# Patient Record
Sex: MALE | Race: WHITE | NOT HISPANIC OR LATINO | Employment: FULL TIME | URBAN - METROPOLITAN AREA
[De-identification: names, ages, dates, MRNs, and addresses within clinical notes are randomized per-mention and may not be internally consistent; named-entity substitution may affect disease eponyms.]

---

## 2017-03-20 ENCOUNTER — HOSPITAL ENCOUNTER (OUTPATIENT)
Dept: SLEEP CENTER | Facility: CLINIC | Age: 57
Discharge: HOME | End: 2017-03-20
Payer: COMMERCIAL

## 2017-03-20 ENCOUNTER — TRANSCRIBE ORDERS (OUTPATIENT)
Dept: SLEEP CENTER | Facility: CLINIC | Age: 57
End: 2017-03-20

## 2017-03-20 DIAGNOSIS — G47.33 OSA (OBSTRUCTIVE SLEEP APNEA): ICD-10-CM

## 2017-03-20 DIAGNOSIS — G47.33 OSA (OBSTRUCTIVE SLEEP APNEA): Primary | ICD-10-CM

## 2018-03-26 ENCOUNTER — OFFICE VISIT (OUTPATIENT)
Dept: SLEEP CENTER | Facility: CLINIC | Age: 58
End: 2018-03-26
Payer: COMMERCIAL

## 2018-03-26 VITALS
DIASTOLIC BLOOD PRESSURE: 76 MMHG | HEART RATE: 80 BPM | HEIGHT: 67 IN | WEIGHT: 263.2 LBS | BODY MASS INDEX: 41.31 KG/M2 | SYSTOLIC BLOOD PRESSURE: 120 MMHG

## 2018-03-26 DIAGNOSIS — G47.33 OSA (OBSTRUCTIVE SLEEP APNEA): Primary | ICD-10-CM

## 2018-03-26 DIAGNOSIS — IMO0001 CLASS 3 OBESITY DUE TO EXCESS CALORIES WITH SERIOUS COMORBIDITY AND BODY MASS INDEX (BMI) OF 40.0 TO 44.9 IN ADULT: ICD-10-CM

## 2018-03-26 PROCEDURE — 99214 OFFICE O/P EST MOD 30 MIN: CPT | Performed by: PSYCHIATRY & NEUROLOGY

## 2018-03-26 RX ORDER — LISINOPRIL 10 MG/1
10 TABLET ORAL
COMMUNITY
Start: 2017-07-06

## 2018-03-26 RX ORDER — BIMATOPROST 0.3 MG/ML
SOLUTION/ DROPS OPHTHALMIC
COMMUNITY
Start: 2012-10-31

## 2018-03-26 RX ORDER — TESTOSTERONE 12.5 MG/1.25G
GEL TOPICAL
COMMUNITY
Start: 2012-02-29

## 2018-03-26 NOTE — PROGRESS NOTES
Progress Note - [address] [facility] 59 y o  male   ROSALIO:[birth year], MRN: [id number]  3/26/2018          Follow Up Evaluation / Problem:     Obstructive Sleep Apnea  Obesity    HPI: Quintin Houser is a 59y o  year old male  He has a history of obstructive sleep apnea and obesity  He returns today for re-evaluation  Review of Systems      Genitourinary none   Cardiology none   Gastrointestinal none   Neurology none   Constitutional none   Integumentary none   Psychiatry none   Musculoskeletal none   Pulmonary none   ENT nasal or sinus congestion and post nasal drip   Endocrine none   Hematological none       Current Outpatient Prescriptions:     bimatoprost (LUMIGAN) 0 03 % ophthalmic drops, 1 drop each eye every night, Disp: , Rfl:     lisinopril (ZESTRIL) 10 mg tablet, Take 10 mg by mouth, Disp: , Rfl:     testosterone (ANDROGEL) 25 MG/2 5GM (1%) GEL, Place on the skin, Disp: , Rfl:     [place] Sleepiness Scale  Sitting and reading: Would never doze  Watching TV: Would never doze  Sitting, inactive in a public place (e g  a theatre or a meeting): Would never doze  As a passenger in a car for an hour without a break: Slight chance of dozing  Lying down to rest in the afternoon when circumstances permit: Slight chance of dozing  Sitting and talking to someone: Would never doze  Sitting quietly after a lunch without alcohol: Would never doze  In a car, while stopped for a few minutes in traffic: Would never doze  Total score: 2         Vitals:    03/26/18 0800   BP: 120/76   Pulse: 80   Weight: 119 kg (263 lb 3 2 oz)   Height: 5' 7" (1 702 m)       Body mass index is 41 22 kg/m²  Neck Circumference: 44       EPWORTH SLEEPINESS SCORE  Total score: 2      Past History Since Last Sleep Center Visit:     He continues to use nasal CPAP at 13 cm of water pressure  To this point in time there has been no significant difficulty    He does avoid the use of CPAP when having significant nasal congestion weight is been stable over the last several years  He has controlled his diet as best as possible but has difficulty losing weight due to his abnormal schedule  The following portions of the patient's history were reviewed and updated as appropriate: allergies, current medications, past family history, past medical history, past social history, past surgical history and problem list     OBJECTIVE    PAP Pressure: Nasal CPAP set to deliver 13 cm of water pressure  DME Provider:  Javed    Physical Exam:     General Appearance:   Alert, cooperative, no distress, appears stated age     Head:   Normocephalic, without obvious abnormality, atraumatic     Eyes:   PERRL, conjunctiva/corneas clear, EOM's intact          Nose:  Nares normal, septum midline, no drainage or sinus tenderness           Throat:  Lips, teeth and gums normal; tongue normal size and  shape and midline mucosa redundant bilaterally, uvula long and thick, tonsils not well seen, Mallampati class 4      Neck:  Supple, symmetrical, trachea midline, no adenopathy; Thyroid: No enlargement, tenderness or nodules; no carotid bruit or JVD     Lungs:      Clear to auscultation bilaterally, respirations unlabored     Heart:   Regular rate and rhythm, S1 and S2 normal, no murmur, rub or gallop       Extremities:  Extremities normal, atraumatic, no cyanosis or edema     Pulses:  2+ and symmetric all extremities     Skin:  Skin color, texture, turgor normal, no rashes or lesions       Neurologic:  CNII-XII intact  Normal strength, sensation throughout       ASSESSMENT / PLAN    1  SUREKHA (obstructive sleep apnea)     2  Class 3 obesity due to excess calories with serious comorbidity and body mass index (BMI) of 40 0 to 44 9 in adult [facility])             Counseling / Coordination of Care  Total clinic time spent today 25 minutes  Greater than 50% of total time was spent with the patient and / or family counseling and / or coordination of care  A description of the counseling / coordination of care:     Impressions, Prognosis, Instructions for management, Risks and benefits of treatment, Patient and family education and Importance of compliance with treatment    The following instructions have been given to the patient today:    Patient Instructions   1  Continue nasal CPAP at 13 cm of water  2  Obtain new CPAP supplies on a regular basis  3  Provide a new prescription to obtain CPAP supplies on a regular basis over the next 12 months  4  Attempt weight loss using a combination of diet and exercise  5  Return in 1 year for routine follow-up evaluation  6  Contact the Sleep [address] [place] [address] with any problems that may arise prior to time          Leodan Trotter

## 2018-03-26 NOTE — PATIENT INSTRUCTIONS
1   Continue nasal CPAP at 13 cm of water  2  Obtain new CPAP supplies on a regular basis  3  Provide a new prescription to obtain CPAP supplies on a regular basis over the next 12 months  4  Attempt weight loss using a combination of diet and exercise  5  Return in 1 year for routine follow-up evaluation  6  Contact the Sleep [address] [place] [address] with any problems that may arise prior to time

## 2019-04-01 ENCOUNTER — OFFICE VISIT (OUTPATIENT)
Dept: SLEEP CENTER | Facility: CLINIC | Age: 59
End: 2019-04-01
Payer: COMMERCIAL

## 2019-04-01 VITALS
HEIGHT: 67 IN | WEIGHT: 261.4 LBS | SYSTOLIC BLOOD PRESSURE: 144 MMHG | HEART RATE: 80 BPM | BODY MASS INDEX: 41.03 KG/M2 | DIASTOLIC BLOOD PRESSURE: 72 MMHG

## 2019-04-01 DIAGNOSIS — G47.33 OSA (OBSTRUCTIVE SLEEP APNEA): Primary | ICD-10-CM

## 2019-04-01 PROCEDURE — 99215 OFFICE O/P EST HI 40 MIN: CPT | Performed by: PSYCHIATRY & NEUROLOGY

## 2020-04-02 ENCOUNTER — TELEPHONE (OUTPATIENT)
Dept: SLEEP CENTER | Facility: CLINIC | Age: 60
End: 2020-04-02

## 2020-05-07 ENCOUNTER — TELEMEDICINE (OUTPATIENT)
Dept: SLEEP CENTER | Facility: CLINIC | Age: 60
End: 2020-05-07
Payer: COMMERCIAL

## 2020-05-07 VITALS
HEART RATE: 80 BPM | DIASTOLIC BLOOD PRESSURE: 72 MMHG | BODY MASS INDEX: 40.97 KG/M2 | HEIGHT: 67 IN | SYSTOLIC BLOOD PRESSURE: 144 MMHG | WEIGHT: 261 LBS

## 2020-05-07 DIAGNOSIS — E66.01 CLASS 3 SEVERE OBESITY DUE TO EXCESS CALORIES WITHOUT SERIOUS COMORBIDITY WITH BODY MASS INDEX (BMI) OF 40.0 TO 44.9 IN ADULT (HCC): ICD-10-CM

## 2020-05-07 DIAGNOSIS — G47.33 OSA (OBSTRUCTIVE SLEEP APNEA): Primary | ICD-10-CM

## 2020-05-07 PROCEDURE — 99213 OFFICE O/P EST LOW 20 MIN: CPT | Performed by: PSYCHIATRY & NEUROLOGY

## 2020-05-08 ENCOUNTER — TELEPHONE (OUTPATIENT)
Dept: SLEEP CENTER | Facility: CLINIC | Age: 60
End: 2020-05-08

## 2020-12-30 ENCOUNTER — IMMUNIZATIONS (OUTPATIENT)
Dept: FAMILY MEDICINE CLINIC | Facility: HOSPITAL | Age: 60
End: 2020-12-30
Payer: COMMERCIAL

## 2020-12-30 DIAGNOSIS — Z23 ENCOUNTER FOR IMMUNIZATION: ICD-10-CM

## 2020-12-30 PROCEDURE — 0011A SARS-COV-2 / COVID-19 MRNA VACCINE (MODERNA) 100 MCG: CPT

## 2020-12-30 PROCEDURE — 91301 SARS-COV-2 / COVID-19 MRNA VACCINE (MODERNA) 100 MCG: CPT

## 2021-01-26 ENCOUNTER — IMMUNIZATIONS (OUTPATIENT)
Dept: FAMILY MEDICINE CLINIC | Facility: HOSPITAL | Age: 61
End: 2021-01-26

## 2021-01-26 DIAGNOSIS — Z23 ENCOUNTER FOR IMMUNIZATION: Primary | ICD-10-CM

## 2021-01-26 PROCEDURE — 0012A SARS-COV-2 / COVID-19 MRNA VACCINE (MODERNA) 100 MCG: CPT

## 2021-01-26 PROCEDURE — 91301 SARS-COV-2 / COVID-19 MRNA VACCINE (MODERNA) 100 MCG: CPT

## 2021-05-03 ENCOUNTER — TELEPHONE (OUTPATIENT)
Dept: SLEEP CENTER | Facility: CLINIC | Age: 61
End: 2021-05-03

## 2021-05-10 ENCOUNTER — OFFICE VISIT (OUTPATIENT)
Dept: SLEEP CENTER | Facility: CLINIC | Age: 61
End: 2021-05-10
Payer: COMMERCIAL

## 2021-05-10 VITALS
HEIGHT: 67 IN | DIASTOLIC BLOOD PRESSURE: 62 MMHG | WEIGHT: 258.6 LBS | SYSTOLIC BLOOD PRESSURE: 130 MMHG | BODY MASS INDEX: 40.59 KG/M2

## 2021-05-10 DIAGNOSIS — G47.33 OBSTRUCTIVE SLEEP APNEA: Primary | ICD-10-CM

## 2021-05-10 DIAGNOSIS — I10 BENIGN ESSENTIAL HYPERTENSION: ICD-10-CM

## 2021-05-10 PROCEDURE — 99214 OFFICE O/P EST MOD 30 MIN: CPT | Performed by: NURSE PRACTITIONER

## 2021-05-10 NOTE — PROGRESS NOTES
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  Obesity      Thank you for the opportunity of participating in the evaluation and care of this patient in the Sleep Clinic at [facility]  HPI: Libia Mcconnell is a 56y o  year old male  The patient presents for follow up of severe obstructive sleep apnea  He completed a diagnostic sleep study in May 2010, which confirmed severe SUREKHA with AHI of 39 7 and oxygen lainey of 86%  He began the use of CPAP and continues to use his original equipment  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, 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  He has taken his equipment, but also sleeps without it at times, due to inconvenience  He sleeps much more soundly and feels less sleepy when using the equipment  Review of Systems      Genitourinary none   Cardiology none   Gastrointestinal none   Neurology none   Constitutional none   Integumentary none   Psychiatry none   Musculoskeletal back pain   Pulmonary snoring   ENT none   Endocrine none   Hematological none       Current Outpatient Medications:     bimatoprost (LUMIGAN) 0 03 % ophthalmic drops, 1 drop each eye every night, Disp: , Rfl:     lisinopril (ZESTRIL) 10 mg tablet, Take 10 mg by mouth, Disp: , Rfl:     testosterone (ANDROGEL) 25 MG/2 5GM (1%) GEL, Place on the skin, Disp: , Rfl:     [place] Sleepiness Scale  Sitting and reading: Would never doze  Watching TV: Slight chance of dozing  Sitting, inactive in a public place (e g  a theatre or a meeting):  Would never doze  As a passenger in a car for an hour without a break: Slight chance of dozing  Lying down to rest in the afternoon when circumstances permit: Slight chance of dozing  Sitting and talking to someone: Would never doze  Sitting quietly after a lunch without alcohol: Slight chance of dozing  In a car, while stopped for a few minutes in traffic: Would never doze  Total score: 4              Vitals:    05/10/21 0800   BP: 130/62   Weight: 117 kg (258 lb 9 6 oz)   Height: 5' 7" (1 702 m)       Body mass index is 40 5 kg/m²  Neck Circumference: 18       EPWORTH SLEEPINESS SCORE  Total score: 4      Past History Since Last Sleep Center Visit:   He denies any significant changes to his health since his last visit  He tries to use his CPAP equipment as much as possible, but is required to work longer shifts at times  He feels sleep is much improved when using PAP therapy  He likes the current mask and feels the current pressure setting is comfortable  The patient reports that he cleans the equipment appropriately and changes supplies on a regular basis  The review of systems and following portions of the patient's history were reviewed and updated as appropriate: allergies, current medications, past family history, past medical history, past social history, past surgical history, and problem list       OBJECTIVE    PAP Pressure: Nasal CPAP set to deliver 13 cm of water pressure  Pressure changed to 11-13cm  Type of mask used: nasal  DME Provider: [facility] ([reviously used [facility])    Physical Exam:     General Appearance:   Alert, cooperative, no distress, appears stated age, obese     Head:   Normocephalic, without obvious abnormality, atraumatic     Eyes:   PERRL, conjunctiva/corneas clear, EOM's intact          Nose:  Nares normal, septum midline, no drainage or sinus tenderness           Throat:  Lips, teeth and gums normal; tongue normal size and  shape and midline mucosa moist and mildly redundant bilaterally, uvula normal, tonsils not visualized, Mallampati class 3-4       Neck:  Supple, symmetrical, trachea midline, no adenopathy;  Thyroid: No enlargement, tenderness or nodules; no carotid bruit or JVD     Lungs:      Clear to auscultation bilaterally, respirations unlabored     Heart:   Regular rate and rhythm, S1 and S2 normal, no murmur, rub or gallop       Extremities:  Extremities normal, atraumatic, no cyanosis or edema       Skin:  Skin color, texture, turgor normal, no rashes or lesions       Neurologic:  No focal deficits noted       ASSESSMENT / PLAN    1  Obstructive sleep apnea  Cpap New DME   2  Benign essential hypertension           Counseling / Coordination of Care  Total clinic time spent today 30 minutes  Greater than 50% of total time was spent with the patient and / or family counseling and / or coordination of care  A description of the counseling / coordination of care:     Impressions, Diagnostic results, Prognosis, Instructions for management, Risks and benefits of treatment, Patient and family education, Risk factor reductions and Importance of compliance with treatment    Today I reviewed the patient's compliance data  His equipment no longer transmits data, however, he brought his SD card  There was minimal data noted, which indicated an AHI of 0 0 at a pressure of 13cm  The patient feels they benefit from the use of PAP equipment and would like to continue PAP therapy  Response to treatment has been very good  He would like to have his equipment replaced  An order for new CPAP equipment, using a setting of APAP 11-13cm has been provided  He used [facility] in the past, which is no longer compatible with his insurance  He would like to change DME providers to [facility]  We discussed the importance of meeting the compliance guidelines after receiving the new equipment  He states understanding of this  He will continue using this equipment at the settings noted above for the next 2-3 months  At that timehe will then return for a compliance follow-up evaluation   I have asked the patient to contact the Sleep [facility] if he encounters any difficulties prior to that time  The following instructions have been given to the patient today:    Patient Instructions   1  Schedule an appointment for set up of new CPAP equipment   2  Begin using your CPAP equipment every night  3  Begin cleaning your CPAP daily after use and once weekly with 1 part white vinegar and 10 parts water  4  Contact your medical equipment distributor regarding any questions concerning your CPAP equipment  5  Contact the Sleep [address] [place] [address] with any other questions, prior to next visit, if needed  6  Schedule compliance follow-up visit in 2-3 months      Nursing Support:  When: Monday through Friday 7A-5PM except holidays  Where: Our direct line is [phone]  If you are having a true emergency please call 911  In the event that the line is busy or it is after hours please leave a voice message and we will return your call  Please speak clearly, leaving your full name, birth date, best number to reach you and the reason for your call  Medication refills: We will need the name of the medication, the dosage, the ordering provider, whether you get a 30 or 90 day refill, and the pharmacy name and address  Medications will be ordered by the provider only  Nurses cannot call in prescriptions  Please allow 7 days for medication refills  Physician requested updates: If your provider requested that you call with an update after starting medication, please be ready to provide us the medication and dosage, what time you take your medication, the time you attempt to fall asleep, time you fall asleep, when you wake up, and what time you get out of bed  Sleep Study Results: We will contact you with sleep study results and/or next steps after the physician has reviewed your testing        Jourdan Clement [address] [place] [address]

## 2021-05-10 NOTE — PATIENT INSTRUCTIONS
1   Schedule an appointment for set up of new CPAP equipment   2  Begin using your CPAP equipment every night  3  Begin cleaning your CPAP daily after use and once weekly with 1 part white vinegar and 10 parts water  4  Contact your medical equipment distributor regarding any questions concerning your CPAP equipment  5  Contact the [address] with any other questions, prior to next visit, if needed  6  Schedule compliance follow-up visit in 2-3 months      Nursing Support:  When: Monday through Friday 7A-5PM except holidays  Where: Our direct line is [phone]  If you are having a true emergency please call 911  In the event that the line is busy or it is after hours please leave a voice message and we will return your call  Please speak clearly, leaving your full name, birth date, best number to reach you and the reason for your call  Medication refills: We will need the name of the medication, the dosage, the ordering provider, whether you get a 30 or 90 day refill, and the pharmacy name and address  Medications will be ordered by the provider only  Nurses cannot call in prescriptions  Please allow 7 days for medication refills  Physician requested updates: If your provider requested that you call with an update after starting medication, please be ready to provide us the medication and dosage, what time you take your medication, the time you attempt to fall asleep, time you fall asleep, when you wake up, and what time you get out of bed  Sleep Study Results: We will contact you with sleep study results and/or next steps after the physician has reviewed your testing

## 2021-05-13 ENCOUNTER — TELEPHONE (OUTPATIENT)
Dept: SLEEP CENTER | Facility: CLINIC | Age: 61
End: 2021-05-13

## 2021-05-13 NOTE — TELEPHONE ENCOUNTER
Left message for patient, following up from visit 5/10/2021 with  Kenton Flores     Will need to verify if he received script or would like to make appointment with [facility]

## 2021-05-14 NOTE — TELEPHONE ENCOUNTER
I spoke with patient, states he would like to use [facility]  Scheduled set up 5/28/2021 in [facility] representative aware    Compliance follow up rescheduled for 7/23/2021

## 2021-05-14 NOTE — TELEPHONE ENCOUNTER
Patient left voice message stating he needs to make an appointment to get his CPAP machine  Left message for patient to call office to schedule set up  Phone number provided  Patient has compliance follow up with Romel Muñoz on 8/25/21 that will likely need to be rescheduled

## 2021-05-31 ENCOUNTER — TELEPHONE (OUTPATIENT)
Dept: SLEEP CENTER | Facility: CLINIC | Age: 61
End: 2021-05-31

## 2021-07-23 ENCOUNTER — OFFICE VISIT (OUTPATIENT)
Dept: SLEEP CENTER | Facility: CLINIC | Age: 61
End: 2021-07-23
Payer: COMMERCIAL

## 2021-07-23 VITALS
HEART RATE: 86 BPM | HEIGHT: 67 IN | OXYGEN SATURATION: 99 % | BODY MASS INDEX: 40.49 KG/M2 | DIASTOLIC BLOOD PRESSURE: 80 MMHG | WEIGHT: 258 LBS | SYSTOLIC BLOOD PRESSURE: 140 MMHG

## 2021-07-23 DIAGNOSIS — G47.33 OBSTRUCTIVE SLEEP APNEA TREATED WITH CONTINUOUS POSITIVE AIRWAY PRESSURE (CPAP): Primary | ICD-10-CM

## 2021-07-23 DIAGNOSIS — Z99.89 OBSTRUCTIVE SLEEP APNEA TREATED WITH CONTINUOUS POSITIVE AIRWAY PRESSURE (CPAP): Primary | ICD-10-CM

## 2021-07-23 PROCEDURE — 99214 OFFICE O/P EST MOD 30 MIN: CPT | Performed by: NURSE PRACTITIONER

## 2021-07-23 NOTE — PATIENT INSTRUCTIONS
1   Continue use of CPAP equipment nightly  2  Continue to clean your equipment, as discussed  3  Contact the Sleep [address] [place] [address] with any questions or concerns prior to your next visit, as needed  4  Schedule visit for follow-up in one year    Consider downloading the Dream  melissa to view your usage, mask fit and AHI, with a goal of AHI less than 5      Nursing Support:  When: Monday through Friday 7A-5PM except holidays  Where: Our direct line is [phone]  If you are having a true emergency please call 911  In the event that the line is busy or it is after hours please leave a voice message and we will return your call  Please speak clearly, leaving your full name, birth date, best number to reach you and the reason for your call  Medication refills: We will need the name of the medication, the dosage, the ordering provider, whether you get a 30 or 90 day refill, and the pharmacy name and address  Medications will be ordered by the provider only  Nurses cannot call in prescriptions  Please allow 7 days for medication refills  Physician requested updates: If your provider requested that you call with an update after starting medication, please be ready to provide us the medication and dosage, what time you take your medication, the time you attempt to fall asleep, time you fall asleep, when you wake up, and what time you get out of bed  Sleep Study Results: We will contact you with sleep study results and/or next steps after the physician has reviewed your testing

## 2021-07-23 NOTE — PROGRESS NOTES
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  Obesity      Thank you for the opportunity of participating in the evaluation and care of this patient in the Sleep Clinic at [facility]  HPI: Michelle Heaton is a 56y o  year old male  The patient presents for follow up of severe obstructive sleep apnea  He completed a diagnostic sleep study in May 2010, which confirmed severe SUREKHA with AHI of 39 7 and oxygen lainey of 86%  He began the use of CPAP and continues to use his original equipment  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, 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  He has taken his equipment, but also sleeps without it at times, due to inconvenience  He sleeps much more soundly and feels less sleepy when using the equipment  Review of Systems      Genitourinary none   Cardiology none   Gastrointestinal none   Neurology none   Constitutional none   Integumentary none   Psychiatry none   Musculoskeletal none   Pulmonary snoring   ENT none   Endocrine none   Hematological none       Current Outpatient Medications:     lisinopril (ZESTRIL) 10 mg tablet, Take 10 mg by mouth, Disp: , Rfl:     testosterone (ANDROGEL) 25 MG/2 5GM (1%) GEL, Place on the skin, Disp: , Rfl:     bimatoprost (LUMIGAN) 0 03 % ophthalmic drops, 1 drop each eye every night (Patient not taking: Reported on 7/23/2021), Disp: , Rfl:     Montrose Sleepiness Scale  Sitting and reading: Would never doze  Watching TV: Would never doze  Sitting, inactive in a public place (e g  a theatre or a meeting):  Would never doze  As a passenger in a car for an hour without a break: Would never doze  Lying down to rest in the afternoon when circumstances permit: Would never doze  Sitting and talking to someone: Would never doze  Sitting quietly after a lunch without alcohol: Would never doze  In a car, while stopped for a few minutes in traffic: Would never doze  Total score: 0              Vitals:    07/23/21 1000   BP: 140/80   Pulse: 86   SpO2: 99%   Weight: 117 kg (258 lb)   Height: 5' 7" (1 702 m)       Body mass index is 40 41 kg/m²  Neck Circumference: 17       EPWORTH SLEEPINESS SCORE  Total score: 0      Past History Since Last Sleep Center Visit:   He denies any significant changes to his health since his last visit  He received his new CPAP equipment and began using it on June 1st   He tries to use it every day, but on some days, he is not sleeping the minimum requirement of 4 hours within the period, due to being away at work and on call  He likes the new machine and feels the setting is very comfortable  He is using a new nasal mask and likes it much more than his older model  The patient reports that he cleans the equipment appropriately with ild soap and water and does not use any ozone   The review of systems and following portions of the patient's history were reviewed and updated as appropriate: allergies, current medications, past family history, past medical history, past social history, past surgical history, and problem list       OBJECTIVE    PAP Pressure:  APAP 11-13cm  Type of mask used: nasal  DME Provider: [employer]     Physical Exam:     General Appearance:   Alert, cooperative, no distress, appears stated age, obese     Head:   Normocephalic, without obvious abnormality, atraumatic     Eyes:   PERRL, conjunctiva/corneas clear, EOM's intact          Nose:  Nares normal, septum midline, no drainage or sinus tenderness           Throat:  Lips, teeth and gums normal; tongue normal size and  shape and midline      Neck:  Supple, symmetrical, trachea midline, no adenopathy;  Thyroid: No enlargement, tenderness or nodules; no carotid bruit or JVD     Lungs:      Clear to auscultation bilaterally, respirations unlabored     Heart:   Regular rate and rhythm, S1 and S2 normal, no murmur, rub or gallop       Extremities:  Extremities normal, atraumatic, no cyanosis or edema       Skin:  Skin color, texture, turgor normal, no rashes or lesions       Neurologic:  No focal deficits noted       ASSESSMENT / PLAN    1  Obstructive sleep apnea treated with continuous positive airway pressure (CPAP)  PAP DME Resupply/Reorder         Counseling / Coordination of Care  Total clinic time spent today 30 minutes  Greater than 50% of total time was spent with the patient and / or family counseling and / or coordination of care  A description of the counseling / coordination of care:     Impressions, Diagnostic results, Prognosis, Instructions for management, Risks and benefits of treatment, Patient and family education, Risk factor reductions and Importance of compliance with treatment    Today I reviewed the patient's compliance data  He has been able to use the equipment 90% of all days recorded  Average usage was 4 or more hours 66 7% of all days recorded  The estimated AHI is 0 3 abnormal breathing events per hour  The patient feels he benefits from the use of the equipment and would like to continue PAP treatment  Response to treatment has been good  He will try to increase hourly compliance as he is able with his shift work schedule  Ii suggested he consider taking it to work with him to use if he is able to sleep at all between emergency calls  Supplies have been ordered for the next year  We discussed the use of the Dream  melissa, which will allow the patient to view usage, mask fit and AHI, with a goal of less than 5  He will continue using this equipment at the settings noted above for the next 12  months  At that time he will return for a routine follow-up evaluation   I have asked the patient to contact the [facility] if any difficulties are encountered prior to that time  The following instructions have been given to the patient today:    Patient Instructions   1  Continue use of CPAP equipment nightly  2  Continue to clean your equipment, as discussed  3  Contact the Sleep [facility] with any questions or concerns prior to your next visit, as needed  4  Schedule visit for follow-up in one year    Consider downloading the Dream  melissa to view your usage, mask fit and AHI, with a goal of AHI less than 5      Nursing Support:  When: Monday through Friday 7A-5PM except holidays  Where: Our direct line is [phone]  If you are having a true emergency please call 911  In the event that the line is busy or it is after hours please leave a voice message and we will return your call  Please speak clearly, leaving your full name, birth date, best number to reach you and the reason for your call  Medication refills: We will need the name of the medication, the dosage, the ordering provider, whether you get a 30 or 90 day refill, and the pharmacy name and address  Medications will be ordered by the provider only  Nurses cannot call in prescriptions  Please allow 7 days for medication refills  Physician requested updates: If your provider requested that you call with an update after starting medication, please be ready to provide us the medication and dosage, what time you take your medication, the time you attempt to fall asleep, time you fall asleep, when you wake up, and what time you get out of bed  Sleep Study Results: We will contact you with sleep study results and/or next steps after the physician has reviewed your testing        Tamar Mcneill [facility]

## 2021-07-26 ENCOUNTER — TELEPHONE (OUTPATIENT)
Dept: SLEEP CENTER | Facility: CLINIC | Age: 61
End: 2021-07-26

## 2022-07-25 ENCOUNTER — OFFICE VISIT (OUTPATIENT)
Dept: SLEEP CENTER | Facility: CLINIC | Age: 62
End: 2022-07-25
Payer: COMMERCIAL

## 2022-07-25 VITALS
HEART RATE: 69 BPM | DIASTOLIC BLOOD PRESSURE: 60 MMHG | WEIGHT: 215 LBS | BODY MASS INDEX: 33.74 KG/M2 | HEIGHT: 67 IN | SYSTOLIC BLOOD PRESSURE: 102 MMHG

## 2022-07-25 DIAGNOSIS — G47.33 OBSTRUCTIVE SLEEP APNEA (ADULT) (PEDIATRIC): ICD-10-CM

## 2022-07-25 DIAGNOSIS — G47.33 OBSTRUCTIVE SLEEP APNEA TREATED WITH CONTINUOUS POSITIVE AIRWAY PRESSURE (CPAP): Primary | ICD-10-CM

## 2022-07-25 DIAGNOSIS — Z99.89 OBSTRUCTIVE SLEEP APNEA TREATED WITH CONTINUOUS POSITIVE AIRWAY PRESSURE (CPAP): Primary | ICD-10-CM

## 2022-07-25 DIAGNOSIS — Z99.89 DEPENDENCE ON OTHER ENABLING MACHINES AND DEVICES: ICD-10-CM

## 2022-07-25 PROBLEM — E83.119 HEMOCHROMATOSIS: Status: ACTIVE | Noted: 2022-07-20

## 2022-07-25 PROCEDURE — 99214 OFFICE O/P EST MOD 30 MIN: CPT | Performed by: NURSE PRACTITIONER

## 2022-07-25 RX ORDER — SPIRONOLACTONE 100 MG/1
50 TABLET, FILM COATED ORAL DAILY
COMMUNITY
Start: 2022-07-01

## 2022-07-25 RX ORDER — FUROSEMIDE 20 MG/1
TABLET ORAL
COMMUNITY
Start: 2022-07-06

## 2022-07-25 RX ORDER — LOSARTAN POTASSIUM 25 MG/1
TABLET ORAL
COMMUNITY
Start: 2022-06-20

## 2022-07-25 RX ORDER — CARVEDILOL 3.12 MG/1
3.12 TABLET ORAL 2 TIMES DAILY WITH MEALS
COMMUNITY
Start: 2022-07-07

## 2022-07-25 NOTE — PATIENT INSTRUCTIONS
1   Continue use of CPAP equipment nightly  2  Continue to clean your equipment, as discussed  3  Contact the Sleep [address] [place] [address] with any questions or concerns prior to your next visit, as needed  4  Schedule visit for follow-up in 1 year     Nursing Support:  When: Monday through Friday 7A-5PM except holidays  Where: Our direct line is [phone]  If you are having a true emergency please call 911  In the event that the line is busy or it is after hours please leave a voice message and we will return your call  Please speak clearly, leaving your full name, birth date, best number to reach you and the reason for your call  Medication refills: We will need the name of the medication, the dosage, the ordering provider, whether you get a 30 or 90 day refill, and the pharmacy name and address  Medications will be ordered by the provider only  Nurses cannot call in prescriptions  Please allow 7 days for medication refills  Physician requested updates: If your provider requested that you call with an update after starting medication, please be ready to provide us the medication and dosage, what time you take your medication, the time you attempt to fall asleep, time you fall asleep, when you wake up, and what time you get out of bed  Sleep Study Results: We will contact you with sleep study results and/or next steps after the physician has reviewed your testing

## 2022-07-25 NOTE — PROGRESS NOTES
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  Obesity      Thank you for the opportunity of participating in the evaluation and care of this patient in the Sleep Clinic at [facility]  HPI: Zari Tello is a 59y o  year old male  The patient presents for follow up of severe obstructive sleep apnea  He completed a diagnostic sleep study in May 2010, which confirmed severe SUREKHA with AHI of 39 7 and oxygen lainey of 86%  He began the use of CPAP and continues to use his original equipment  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, 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  He has taken his equipment, but also sleeps without it at times, due to inconvenience  He sleeps much more soundly and feels less sleepy when using the equipment  Review of Systems      Genitourinary none   Cardiology none   Gastrointestinal none   Neurology none   Constitutional none   Integumentary none   Psychiatry none   Musculoskeletal none   Pulmonary none   ENT none   Endocrine none   Hematological none       Current Outpatient Medications:     carvedilol (COREG) 3 125 mg tablet, Take 3 125 mg by mouth 2 (two) times a day with meals, Disp: , Rfl:     furosemide (LASIX) 20 mg tablet, TAKE 2 TABLETS (40 MG TOTAL) BY MOUTH DAILY  OR AS DIRECTED BY PHYSICIAN , Disp: , Rfl:     losartan (COZAAR) 25 mg tablet, TAKE 1 TABLET (25 MG TOTAL) BY MOUTH DAILY  , Disp: , Rfl:     spironolactone (ALDACTONE) 100 mg tablet, Take 100 mg by mouth daily, Disp: , Rfl:     bimatoprost (LUMIGAN) 0 03 % ophthalmic drops, 1 drop each eye every night (Patient not taking: No sig reported), Disp: , Rfl:     lisinopril (ZESTRIL) 10 mg tablet, Take 10 mg by mouth (Patient not taking: Reported on 7/25/2022), Disp: , Rfl:     testosterone (ANDROGEL) 25 MG/2 5GM (1%) GEL, Place on the skin (Patient not taking: Reported on 7/25/2022), Disp: , Rfl:     Homer Sleepiness Scale  Sitting and reading: Slight chance of dozing  Watching TV: Moderate chance of dozing  Sitting, inactive in a public place (e g  a theatre or a meeting): Would never doze  As a passenger in a car for an hour without a break: Moderate chance of dozing  Lying down to rest in the afternoon when circumstances permit: Moderate chance of dozing  Sitting and talking to someone: Would never doze  Sitting quietly after a lunch without alcohol: Would never doze  In a car, while stopped for a few minutes in traffic: Would never doze  Total score: 7              Vitals:    07/25/22 0938   BP: 102/60   BP Location: Left arm   Patient Position: Sitting   Cuff Size: Large   Pulse: 69   Weight: 97 5 kg (215 lb)   Height: 5' 7" (1 702 m)       Body mass index is 33 67 kg/m²  EPWORTH SLEEPINESS SCORE  Total score: 7      Past History Since Last Sleep Center Visit:   He developed hemochromatosis with symptoms beginning in June  He is currently not working as he is being evaluated and having treatment, including paracentesis  He continues to use the CPAP equipment  Since losing some weight, he feels like the pressure setting is too high  He awakens with belching and bloating  He feels more rested when he uses the CPAP equipment  The review of systems and following portions of the patient's history were reviewed and updated as appropriate: allergies, current medications, past family history, past medical history, past social history, past surgical history, and problem list       OBJECTIVE  Equipment set up date:  5/31/21 - DreamStation 2  PAP Pressure:  APAP 11-13cm - pressure change ordered - 8-12cm  Type of mask used: nasal  DME Provider:  [employer] - [facility]    Physical Exam:     General Appearance:   Alert, cooperative, no distress, appears stated age, obese     Head:   Normocephalic, without obvious abnormality, atraumatic     Eyes:   PERRL, conjunctiva/corneas clear, non-icteric          Nose:  Nares normal, septum midline, no drainage or sinus tenderness           Throat:  Lips, teeth and gums normal; tongue normal size and midline, mucosa moist and redundant bilaterally, uvula only partially visualized, tonsils not visualized, Mallampati class 3      Neck:  Supple, symmetrical, trachea midline, no adenopathy; Thyroid: No enlargement, tenderness or nodules; no carotid bruit or JVD     Lungs:      Clear to auscultation bilaterally, respirations unlabored     Heart:   Regular rate and rhythm, S1 and S2 normal, no murmur, rub or gallop       Extremities:  Extremities normal, atraumatic, no cyanosis and trace edema in LE bilaterally       Skin:  Appears slightly jaundiced, texture and turgor normal, no rashes or lesions       Neurologic:  No focal deficits noted       ASSESSMENT / PLAN    1  Obstructive sleep apnea treated with continuous positive airway pressure (CPAP)  PAP DME Pressure Change    PAP DME Resupply/Reorder         Counseling / Coordination of Care  Total clinic time spent today 30 minutes  Greater than 50% of total time was spent with the patient and / or family counseling and / or coordination of care  A description of the counseling / coordination of care:     Impressions, Diagnostic results, Prognosis, Instructions for management, Risks and benefits of treatment, Patient and family education, Risk factor reductions and Importance of compliance with treatment    Today I reviewed the patient's compliance data  He has been able to use the equipment 100% of all days recorded  Average usage was 4 or more hours 73 3% of all days recorded  The estimated AHI is 0 7 abnormal breathing events per hour    a pressure change to 8-12cm has been ordered and done in the office by the [address] [place] [address] representative  The patient feels he benefits from the use of the equipment and would like to continue PAP treatment  Response to treatment has been good  A prescription for supplies has been provided for the next year  He will continue using this equipment at the settings noted above for the next 12  months  At that time he will return for a routine follow-up evaluation  I have asked the patient to contact the [facility] if any difficulties are encountered prior to that time  The following instructions have been given to the patient today:    Patient Instructions   1  Continue use of CPAP equipment nightly  2  Continue to clean your equipment, as discussed  3  Contact the Sleep [facility] with any questions or concerns prior to your next visit, as needed  4  Schedule visit for follow-up in 1 year     Nursing Support:  When: Monday through Friday 7A-5PM except holidays  Where: Our direct line is [phone]  If you are having a true emergency please call 911  In the event that the line is busy or it is after hours please leave a voice message and we will return your call  Please speak clearly, leaving your full name, birth date, best number to reach you and the reason for your call  Medication refills: We will need the name of the medication, the dosage, the ordering provider, whether you get a 30 or 90 day refill, and the pharmacy name and address  Medications will be ordered by the provider only  Nurses cannot call in prescriptions  Please allow 7 days for medication refills  Physician requested updates: If your provider requested that you call with an update after starting medication, please be ready to provide us the medication and dosage, what time you take your medication, the time you attempt to fall asleep, time you fall asleep, when you wake up, and what time you get out of bed  Sleep Study Results:  We will contact you with sleep study results and/or next steps after the physician has reviewed your testing        Whitney Sharma, [facility]

## 2022-07-26 ENCOUNTER — TELEPHONE (OUTPATIENT)
Dept: SLEEP CENTER | Facility: CLINIC | Age: 62
End: 2022-07-26

## 2023-07-24 ENCOUNTER — TELEPHONE (OUTPATIENT)
Dept: SLEEP CENTER | Facility: CLINIC | Age: 63
End: 2023-07-24

## 2023-07-26 ENCOUNTER — OFFICE VISIT (OUTPATIENT)
Dept: SLEEP CENTER | Facility: CLINIC | Age: 63
End: 2023-07-26
Payer: COMMERCIAL

## 2023-07-26 VITALS
SYSTOLIC BLOOD PRESSURE: 129 MMHG | DIASTOLIC BLOOD PRESSURE: 81 MMHG | BODY MASS INDEX: 31.55 KG/M2 | HEART RATE: 81 BPM | HEIGHT: 67 IN | WEIGHT: 201 LBS

## 2023-07-26 DIAGNOSIS — G47.33 OBSTRUCTIVE SLEEP APNEA TREATED WITH CONTINUOUS POSITIVE AIRWAY PRESSURE (CPAP): Primary | ICD-10-CM

## 2023-07-26 DIAGNOSIS — Z99.89 OBSTRUCTIVE SLEEP APNEA TREATED WITH CONTINUOUS POSITIVE AIRWAY PRESSURE (CPAP): Primary | ICD-10-CM

## 2023-07-26 PROCEDURE — 99214 OFFICE O/P EST MOD 30 MIN: CPT | Performed by: NURSE PRACTITIONER

## 2023-07-26 RX ORDER — LORATADINE 10 MG/1
10 TABLET ORAL DAILY
COMMUNITY
Start: 2023-03-30

## 2023-07-26 RX ORDER — TACROLIMUS 5 MG/1
5 CAPSULE ORAL EVERY 12 HOURS
COMMUNITY
Start: 2023-05-18

## 2023-07-26 RX ORDER — TACROLIMUS 1 MG/1
2 CAPSULE ORAL EVERY 12 HOURS
COMMUNITY
Start: 2023-05-18

## 2023-07-26 RX ORDER — AZATHIOPRINE 50 MG/1
100 TABLET ORAL DAILY
COMMUNITY
Start: 2023-05-17

## 2023-07-26 RX ORDER — ONDANSETRON 4 MG/1
4 TABLET, ORALLY DISINTEGRATING ORAL AS NEEDED
COMMUNITY
Start: 2023-06-11

## 2023-07-26 RX ORDER — PREDNISONE 5 MG/1
5 TABLET ORAL DAILY
COMMUNITY
Start: 2023-05-22

## 2023-07-26 NOTE — PATIENT INSTRUCTIONS
1.  Continue use of CPAP equipment nightly  2. Continue to clean your equipment, as discussed  3. Contact the Sleep [facility] with any questions or concerns prior to your next visit, as needed  4. Schedule visit for follow-up in 3-4 months  5. Call if you are having any difficulty using the equipment and we will plan a split night sleep study to re-evaluate  6. I would recommend finding a new PCP to review your diagnoses and follow up regarding the atrial fibrillation noted during your \[facility] [place] [facility]\"" admission. Nursing Support:  When: Monday through Friday 7A-5PM except holidays  Where: Our direct line is [phone]. If you are having a true emergency please call 911. In the event that the line is busy or it is after hours please leave a voice message and we will return your call. Please speak clearly, leaving your full name, birth date, best number to reach you and the reason for your call. Medication refills: We will need the name of the medication, the dosage, the ordering provider, whether you get a 30 or 90 day refill, and the pharmacy name and address. Medications will be ordered by the provider only. Nurses cannot call in prescriptions. Please allow 7 days for medication refills. Physician requested updates: If your provider requested that you call with an update after starting medication, please be ready to provide us the medication and dosage, what time you take your medication, the time you attempt to fall asleep, time you fall asleep, when you wake up, and what time you get out of bed. Sleep Study Results: We will contact you with sleep study results and/or next steps after the physician has reviewed your testing.

## 2023-07-26 NOTE — PROGRESS NOTES
Progress Note - [address] Beckman 57 y.o. 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 year old male. The patient presents for follow up of severe obstructive sleep apnea. He completed a diagnostic sleep study in May 2010, which confirmed severe SUREKHA with AHI of 39.7 and oxygen lainey of 86%. He began the use of CPAP in 2010. He received the DreamStation 2 as new CPAP equipment in 2021. He presents to review compliance and effectiveness of treatment. Current Outpatient Medications:   •  azaTHIOprine (IMURAN) 50 mg tablet, Take 100 mg by mouth daily, Disp: , Rfl:   •  carvedilol (COREG) 3.125 mg tablet, Take 3.125 mg by mouth 2 (two) times a day with meals, Disp: , Rfl:   •  loratadine (CLARITIN) 10 mg tablet, Take 10 mg by mouth daily, Disp: , Rfl:   •  ondansetron (ZOFRAN-ODT) 4 mg disintegrating tablet, Take 4 mg by mouth if needed, Disp: , Rfl:   •  predniSONE 5 mg tablet, Take 5 mg by mouth in the morning, Disp: , Rfl:   •  tacrolimus (PROGRAF) 1 mg capsule, Take 2 mg by mouth every 12 (twelve) hours, Disp: , Rfl:   •  tacrolimus (PROGRAF) 5 mg capsule, Take 5 mg by mouth every 12 (twelve) hours, Disp: , Rfl:   •  bimatoprost (LUMIGAN) 0.03 % ophthalmic drops, 1 drop each eye every night (Patient not taking: No sig reported), Disp: , Rfl:   •  furosemide (LASIX) 20 mg tablet, TAKE 2 TABLETS (40 MG TOTAL) BY MOUTH DAILY. OR AS DIRECTED BY PHYSICIAN.  (Patient not taking: Reported on 7/26/2023), Disp: , Rfl:   •  lisinopril (ZESTRIL) 10 mg tablet, Take 10 mg by mouth (Patient not taking: Reported on 7/25/2022), Disp: , Rfl:   •  losartan (COZAAR) 25 mg tablet, TAKE 1 TABLET (25 MG TOTAL) BY MOUTH DAILY. (Patient not taking: Reported on 7/26/2023), Disp: , Rfl:   •  spironolactone (ALDACTONE) 100 mg tablet, Take 50 mg by mouth daily (Patient not taking: Reported on 7/26/2023), Disp: , Rfl:   •  testosterone (ANDROGEL) 25 MG/2.5GM (1%) GEL, Place on the skin (Patient not taking: Reported on 7/25/2022), Disp: , Rfl:     How likely are you to doze off or fall asleep in the following situations, in contrast to feeling just tired? Sitting and reading: Slight chance of dozing  Watching TV: Slight chance of dozing  Sitting, inactive in a public place (e.g. a theatre or a meeting): Slight chance of dozing  As a passenger in a car for an hour without a break: Slight chance of dozing  Lying down to rest in the afternoon when circumstances permit: Slight chance of dozing  Sitting and talking to someone: Slight chance of dozing  Sitting quietly after a lunch without alcohol: Slight chance of dozing  In a car, while stopped for a few minutes in traffic: Would never doze  Total score: 7              Vitals:    07/26/23 1417   BP: 129/81   BP Location: Left arm   Patient Position: Sitting   Cuff Size: Adult   Pulse: 81   Weight: 91.2 kg (201 lb)   Height: 5' 7" (1.702 m)     A 60 lb weight loss is noted over the past 2 years. Body mass index is 31.48 kg/m². EPWORTH SLEEPINESS SCORE  Total score: 7      Past History Since Last Sleep Center Visit:   He developed hemochromatosis in June 2022. He was found to have cirrhosis of the liver and rapidly developed liver failure. He was placed on the liver transplant list and fortunately was able to receive a liver transplant very quickly, in March 2023. He had hepatic encephalopathy related to his disease process and admits that he was unable to think clearly for a long period of time. He did not recall that he had CPAP equipment and when he received the call about his appointment, he remembered.   He used the equipment last pm.  He reports that settings seemed comfortable and he has supplies to use with the equipment. He states that it is difficult to determine his response, since it was the first time he used the CPAP in a very long time. He states that he has been waking up from sleep every 2 hours for urination. Last pm, when using his CPAP, he only woke up one time for urination. He has been out of work since he became very ill and plans to return to work next month. He will not be working night shift, plans to work evening shift. The review of systems and following portions of the patient's history were reviewed and updated as appropriate: allergies, current medications, past family history, past medical history, past social history, past surgical history, and problem list.      OBJECTIVE  Equipment set up date:  5/31/21 - DreamStation 2  PAP Pressure:  APAP 8-12cm  Type of mask used: nasal  DME Provider: [employer] - [facility]    Physical Exam:     General Appearance:   Alert, cooperative, no distress, appears stated age, obese     Lungs:    Heart:  Clear to auscultation bilaterally, respirations unlabored      Regular rate and rhythm, S1 and S2 normal, no murmur, rub or gallop              ASSESSMENT / PLAN    1. Obstructive sleep apnea treated with continuous positive airway pressure (CPAP)  PAP DME Resupply/Reorder    CANCELED: PAP DME Resupply/Reorder            Counseling / Coordination of Care  I have spent a total time of 30 minutes on 7/26/23 in caring for this patient including Risks and benefits of tx options, Instructions for management, Patient and family education, Importance of tx compliance, Risk factor reductions, Impressions, Counseling / Coordination of care, Documenting in the medical record, Reviewing / ordering tests, medicine, procedures   and Obtaining or reviewing history  . Today I reviewed the patient's compliance data.   He has not been using the CPAP equipment, but used it last PM.  The estimated AHI was 2.2 abnormal breathing events per hour. The patient feels he benefits from the use of the equipment and would like to continue PAP treatment. Response to treatment had been good in the past.  A prescription for supplies has been provided to last for the next year. He will continue using this equipment at the settings noted above for the next 3-4   months. At that time he will return for a routine follow-up evaluation. I have asked the patient to contact the [facility] if any difficulties are encountered prior to that time. We discussed a diagnosis of atrial fibrillation in his diagnosis list.  He reports that he was told during his hospital stay for the transplant that he was in a-fib for a brief period of time. He did not have any follow up regarding the arrhythmia. His PCP no longer works in the area. He plans to find a new PCP and will discuss this at that time, to see if any follow up is needed. He denies any shortness of breath, palpitations or other cardiac symptoms. The following instructions have been given to the patient today:    Patient Instructions   1. Continue use of CPAP equipment nightly  2. Continue to clean your equipment, as discussed  3. Contact the Sleep [facility] with any questions or concerns prior to your next visit, as needed  4. Schedule visit for follow-up in 3-4 months  5. Call if you are having any difficulty using the equipment and we will plan a split night sleep study to re-evaluate  6. I would recommend finding a new PCP to review your diagnoses and follow up regarding the atrial fibrillation noted during your [facility] admission. Nursing Support:  When: Monday through Friday 7A-5PM except holidays  Where: Our direct line is [phone]. If you are having a true emergency please call 911.   In the event that the line is busy or it is after hours please leave a voice message and we will return your call. Please speak clearly, leaving your full name, birth date, best number to reach you and the reason for your call. Medication refills: We will need the name of the medication, the dosage, the ordering provider, whether you get a 30 or 90 day refill, and the pharmacy name and address. Medications will be ordered by the provider only. Nurses cannot call in prescriptions. Please allow 7 days for medication refills. Physician requested updates: If your provider requested that you call with an update after starting medication, please be ready to provide us the medication and dosage, what time you take your medication, the time you attempt to fall asleep, time you fall asleep, when you wake up, and what time you get out of bed. Sleep Study Results: We will contact you with sleep study results and/or next steps after the physician has reviewed your testing.         Ean Silva, [facility]

## 2023-07-27 ENCOUNTER — TELEPHONE (OUTPATIENT)
Dept: SLEEP CENTER | Facility: CLINIC | Age: 63
End: 2023-07-27

## 2023-07-31 LAB

## 2024-01-03 ENCOUNTER — OFFICE VISIT (OUTPATIENT)
Dept: SLEEP CENTER | Facility: CLINIC | Age: 64
End: 2024-01-03
Payer: COMMERCIAL

## 2024-01-03 VITALS
HEIGHT: 67 IN | WEIGHT: 227 LBS | BODY MASS INDEX: 35.63 KG/M2 | DIASTOLIC BLOOD PRESSURE: 72 MMHG | OXYGEN SATURATION: 97 % | SYSTOLIC BLOOD PRESSURE: 124 MMHG | RESPIRATION RATE: 14 BRPM | HEART RATE: 82 BPM

## 2024-01-03 DIAGNOSIS — G47.33 OBSTRUCTIVE SLEEP APNEA TREATED WITH CONTINUOUS POSITIVE AIRWAY PRESSURE (CPAP): Primary | ICD-10-CM

## 2024-01-03 PROCEDURE — 99214 OFFICE O/P EST MOD 30 MIN: CPT | Performed by: NURSE PRACTITIONER

## 2024-01-03 RX ORDER — TACROLIMUS 0.5 MG/1
0.5 CAPSULE ORAL 2 TIMES DAILY
COMMUNITY

## 2024-01-03 NOTE — PROGRESS NOTES
Progress Note - [facility]   Randy Beckman 63 y.o. 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 year old male.  The patient presents for follow up of severe obstructive sleep apnea.   He completed a diagnostic sleep study in May 2010, confirming severe SUREKHA with AHI of 39.7 and oxygen lainey of 86%.  He began the use of CPAP in .  He received the DreamStation 2 as new CPAP equipment in .  He presents to review compliance and effectiveness of treatment.      Current Outpatient Medications:     azaTHIOprine (IMURAN) 50 mg tablet, Take 100 mg by mouth daily, Disp: , Rfl:     carvedilol (COREG) 3.125 mg tablet, Take 3.125 mg by mouth 2 (two) times a day with meals, Disp: , Rfl:     loratadine (CLARITIN) 10 mg tablet, Take 10 mg by mouth daily, Disp: , Rfl:     tacrolimus (PROGRAF) 0.5 mg capsule, Take 0.5 mg by mouth 2 (two) times a day, Disp: , Rfl:     tacrolimus (PROGRAF) 1 mg capsule, Take 2 mg by mouth every 12 (twelve) hours, Disp: , Rfl:     How likely are you to doze off or fall asleep in the following situations, in contrast to feeling just tired?  Sitting and reading: Slight chance of dozing  Watching TV: High chance of dozing  Sitting, inactive in a public place (e.g. a theatre or a meeting): Would never doze  As a passenger in a car for an hour without a break: Would never doze  Lying down to rest in the afternoon when circumstances permit: High chance of dozing  Sitting and talking to someone: Would never doze  Sitting quietly after a lunch without alcohol: Would never doze  In a car, while stopped for a few minutes in traffic: Would never doze  Total score:  "7              Vitals:    01/03/24 1403   BP: 124/72   Pulse: 82   Resp: 14   SpO2: 97%   Weight: 103 kg (227 lb)   Height: 5' 7\" (1.702 m)     A 60 lb weight loss is noted over the past 2 years.    Body mass index is 35.55 kg/m².            EPWORTH SLEEPINESS SCORE  Total score: 7      Past History Since Last Sleep Center Visit:   He denies any significant changes to his health since his last visit.  He states that he had URI symptoms for 2 weeks last month and was unable to use his CPAP.  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  He didn't use the equipment at times due to change in sleep pattern and minimal sleep time overall.  He normally works from 4:00pm to midnight, but hopes to change back to 6pm-6am.    He states that when he uses the CPAP, he sleeps much more soundly and wakes up feeling refreshed.  He feels the current settings and nasal mask are comfortable.        The review of systems and following portions of the patient's history were reviewed and updated as appropriate: allergies, current medications, past family history, past medical history, past social history, past surgical history, and problem list.      OBJECTIVE  Equipment set up date:  5/31/21 - DreamStation 2  PAP Pressure:  APAP 8-12cm  Type of mask used: nasal  DME Provider: [employer] - [facility]    Physical Exam:     General Appearance:   Alert, cooperative, no distress, appears stated age, obese     Lungs:    Heart:  Clear to auscultation bilaterally, respirations unlabored      Regular rate and rhythm, S1 and S2 normal, no murmur, rub or gallop              ASSESSMENT / PLAN    1. Obstructive sleep apnea treated with continuous positive airway pressure (CPAP)  PAP DME Resupply/Reorder            Counseling / Coordination of Care  I have spent a total time of 25 minutes on 01/03/24 in caring for this patient including Risks and benefits of tx options, " Instructions for management, Patient and family education, Importance of tx compliance, Risk factor reductions, Impressions, Counseling / Coordination of care, Documenting in the medical record, and Reviewing / ordering tests, medicine, procedures  .      Today I reviewed the patient's compliance data.  He has been able to use the equipment 36.7% of all days recorded.  Average usage was 4 or more hours 26.7% of all days recorded.  The patient uses the equipment for an average of 4 hours and 46 minutes per night.  The estimated AHI is 0.7 abnormal breathing events per hour.   We discussed that he is not at goal for hours of use.  Reasons are noted above.  The importance of increasing use was discussed.  Complications associated with untreated SUREKHA were discussed.  He plans to increase use, now that his schedule is reduced.  He is at goal for effectiveness of treatment and feels he benefits from the use of the equipment.  Response to treatment has been good.  A prescription for supplies has been provided to last for the next year.  We discussed that there was a warning issued on [employer] website regarding the possibility of the DS2 equipment overheating.  He was advised to review the website information for awareness and information has been added to the AVS regarding the warning.    He will continue using this equipment at the settings noted above for the next 12 months.  At that time he will return for a routine follow-up evaluation. I have asked the patient to contact the Sleep Disorders Center if any difficulties are encountered prior to that time.    We discussed the importance of increasing total sleep time, with a goal of 7-9 hours per night.  He reports that he feels much more refreshed when he is able to sleep for 8 hours per night.    The following instructions have been given to the patient today:    Patient Instructions   1.  Continue use of CPAP equipment nightly  2.  Continue to clean your equipment, as  discussed  3.  Contact the Sleep Disorders Center with any questions or concerns prior to your next visit, as needed  4.  Schedule visit for follow-up in 1 year   5.  Work on getting 7-9 hours of sleep per night      Recommendations for Patients, Caregivers, and Health Care Providers  Follow the ’s instructions in the user manualExternal Link Disclaimer, including:  place the CPAP machine on a firm, flat surface  keep the CPAP away from carpet, fabric, or other flammable materials  carefully clean the CPAP machine  empty the CPAP machine’s water reservoir  let the CPAP machine’s heater plate and water tank cool for approximately 15 minutes before removing the tank to reduce the risk of burns. You could be burned if you touch the heater plate, come in contact with the heated water, or touch the humidifier water tank pan.  Inspect and examine the CPAP machine before and after each use for unusual smells or changes in its appearance. Some problems may only be noticeable when the machine is running, so pay attention to any differences in the CPAP machine as you prepare for bed, before you fall asleep.  Unplug the CPAP machine and do not use it if:  you smell burning, smoke, or any unusual odors,  there is a change in the appearance of the CPAP machine,  there are unexplained changes to the CPAP machine’s performance,  water is spilled into the CPAP machine,  you hear unusual sounds coming from the CPAP machine.  If you are unable to use your CPAP device due to these issues, discuss your individual health situation with a health care provider, such as your primary care physician or sleep doctor.  At this time, if you are not experiencing these issues, then the FDA does not recommend discontinuing use of these machines.  Report any concerns about the CPAP machine to the FDA, including unusual smells, sounds, or changes in appearance, and report any concerns to [employer] Disclaimer.      Nursing  Support:  When: Monday through Friday 7A-5PM except holidays  Where: Our direct line is [phone].    If you are having a true emergency please call 911.  In the event that the line is busy or it is after hours please leave a voice message and we will return your call.  Please speak clearly, leaving your full name, birth date, best number to reach you and the reason for your call.   Medication refills: We will need the name of the medication, the dosage, the ordering provider, whether you get a 30 or 90 day refill, and the pharmacy name and address.  Medications will be ordered by the provider only.  Nurses cannot call in prescriptions.  Please allow 7 days for medication refills.  Physician requested updates: If your provider requested that you call with an update after starting medication, please be ready to provide us the medication and dosage, what time you take your medication, the time you attempt to fall asleep, time you fall asleep, when you wake up, and what time you get out of bed.  Sleep Study Results: We will contact you with sleep study results and/or next steps after the physician has reviewed your testing.      ALLA Barragan  [facility]

## 2024-01-03 NOTE — PATIENT INSTRUCTIONS
1.  Continue use of CPAP equipment nightly  2.  Continue to clean your equipment, as discussed  3.  Contact the Sleep Disorders Center with any questions or concerns prior to your next visit, as needed  4.  Schedule visit for follow-up in 1 year   5.  Work on getting 7-9 hours of sleep per night      Recommendations for Patients, Caregivers, and Health Care Providers  Follow the ’s instructions in the user manualExternal Link Disclaimer, including:  place the CPAP machine on a firm, flat surface  keep the CPAP away from carpet, fabric, or other flammable materials  carefully clean the CPAP machine  empty the CPAP machine’s water reservoir  let the CPAP machine’s heater plate and water tank cool for approximately 15 minutes before removing the tank to reduce the risk of burns. You could be burned if you touch the heater plate, come in contact with the heated water, or touch the humidifier water tank pan.  Inspect and examine the CPAP machine before and after each use for unusual smells or changes in its appearance. Some problems may only be noticeable when the machine is running, so pay attention to any differences in the CPAP machine as you prepare for bed, before you fall asleep.  Unplug the CPAP machine and do not use it if:  you smell burning, smoke, or any unusual odors,  there is a change in the appearance of the CPAP machine,  there are unexplained changes to the CPAP machine’s performance,  water is spilled into the CPAP machine,  you hear unusual sounds coming from the CPAP machine.  If you are unable to use your CPAP device due to these issues, discuss your individual health situation with a health care provider, such as your primary care physician or sleep doctor.  At this time, if you are not experiencing these issues, then the FDA does not recommend discontinuing use of these machines.  Report any concerns about the CPAP machine to the FDA, including unusual smells, sounds, or changes in  appearance, and report any concerns to [employer] Link Disclaimer.      Nursing Support:  When: Monday through Friday 7A-5PM except holidays  Where: Our direct line is [phone].    If you are having a true emergency please call 911.  In the event that the line is busy or it is after hours please leave a voice message and we will return your call.  Please speak clearly, leaving your full name, birth date, best number to reach you and the reason for your call.   Medication refills: We will need the name of the medication, the dosage, the ordering provider, whether you get a 30 or 90 day refill, and the pharmacy name and address.  Medications will be ordered by the provider only.  Nurses cannot call in prescriptions.  Please allow 7 days for medication refills.  Physician requested updates: If your provider requested that you call with an update after starting medication, please be ready to provide us the medication and dosage, what time you take your medication, the time you attempt to fall asleep, time you fall asleep, when you wake up, and what time you get out of bed.  Sleep Study Results: We will contact you with sleep study results and/or next steps after the physician has reviewed your testing.

## 2024-01-04 ENCOUNTER — TELEPHONE (OUTPATIENT)
Dept: SLEEP CENTER | Facility: CLINIC | Age: 64
End: 2024-01-04

## 2024-01-04 NOTE — TELEPHONE ENCOUNTER
Rx for CPAP and clinicals sent to [facility] via [place].

## 2024-01-05 LAB

## 2025-01-08 ENCOUNTER — OFFICE VISIT (OUTPATIENT)
Dept: SLEEP CENTER | Facility: CLINIC | Age: 65
End: 2025-01-08
Payer: COMMERCIAL

## 2025-01-08 VITALS
BODY MASS INDEX: 35.47 KG/M2 | WEIGHT: 226 LBS | HEIGHT: 67 IN | SYSTOLIC BLOOD PRESSURE: 121 MMHG | DIASTOLIC BLOOD PRESSURE: 80 MMHG

## 2025-01-08 DIAGNOSIS — G47.20 CIRCADIAN RHYTHM SLEEP DISORDER: ICD-10-CM

## 2025-01-08 DIAGNOSIS — I10 BENIGN ESSENTIAL HYPERTENSION: ICD-10-CM

## 2025-01-08 DIAGNOSIS — G47.33 OBSTRUCTIVE SLEEP APNEA TREATED WITH CONTINUOUS POSITIVE AIRWAY PRESSURE (CPAP): Primary | ICD-10-CM

## 2025-01-08 PROCEDURE — 99213 OFFICE O/P EST LOW 20 MIN: CPT | Performed by: NURSE PRACTITIONER

## 2025-01-08 RX ORDER — OMEGA-3S/DHA/EPA/FISH OIL/D3 300MG-1000
400 CAPSULE ORAL DAILY
COMMUNITY

## 2025-01-08 NOTE — ASSESSMENT & PLAN NOTE
Hypertension - We reviewed the association between untreated obstructive sleep apnea and the increased risk for hypertension. Patient to continue on prescribed anti-hypertensive therapy (coreg) and follow up with PCP for continuity of care.

## 2025-01-08 NOTE — PROGRESS NOTES
Name: Randy Beckman      : 1960      MRN: [id number]  Encounter Provider: ALLA Barragan  Encounter Date: 2025   Encounter department: [facility] [place]  :  Assessment & Plan  Obstructive sleep apnea treated with continuous positive airway pressure (CPAP)  He continues to use CPAP equipment, but has not been able to use it recently, due to ongoing URI symptoms, including post nasal drip and cough.  He usually sleeps well when using the equipment.  He feels the settings and nasal mask are comfortable.  He receives supplies regularly.  He has not had any issues involving the DreamStation 2.  Information regarding the [employer]' warning regarding increased incidences of overheating was provided in the AVS and he was advised to view their website information regarding any additional recommendations.      Current PAP Compliance Data:  Average usage:  He has been able to use the equipment 63.3% of all days recorded.  Average usage was 4 or more hours 36.7% of all days recorded.  Average hours used:  5 hours and 8 minutes  Average time in an air leak:  0 seconds   Average pressure:  9.6 cm of water pressure  Residual AHI:  1.1/hour, including 0.2 OA, 0.1 CA, 0.8 hypopneas    The patient feels they benefit from the use of PAP equipment and would like to continue PAP therapy.    Response to treatment has been good.    The patient is not at goal for hours of PAP use and at goal for effectiveness of treatment.  No changes are needed at this time.    We discussed the importance of increasing use, as able.  He was advised to schedule an appointment with his PCP regarding ongoing cough, which has become productive with colored secretions.  A prescription for supplies has been provided to last for the next year.  The patient was advised to continue to clean the equipment appropriately, as discussed and to change supplies regularly.    He will continue using this equipment at the settings noted above  for the next 12 months.  At that timehe will then return for a routine follow-up evaluation. I have asked the patient to contact the [facility] if he encounters any difficulties prior to that time.  Orders:    PAP DME Resupply/Reorder    Benign essential hypertension  Hypertension - We reviewed the association between untreated obstructive sleep apnea and the increased risk for hypertension. Patient to continue on prescribed anti-hypertensive therapy (coreg) and follow up with PCP for continuity of care.          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  He has worked shift work for a long time and feels he manages it well, however, it is likely contributing to daytime sleepiness.           History of Present Illness    Randy Beckman is a 63 y.o. year old male.  The patient presents for follow up of severe obstructive sleep apnea.   He completed a diagnostic sleep study in May 2010, confirming severe SUREKHA with AHI of 39.7 and oxygen lainey of 86%.  He began the use of CPAP in 2010.  He received the DreamStation 2 as new CPAP equipment in 2021.  He presents to review compliance and effectiveness of treatment.      Patient accompanied at this vist by : unaccompanied      Sitting and reading: Slight chance of dozing  Watching TV: Slight chance of dozing  Sitting, inactive in a public place (e.g. a theatre or a meeting): Slight chance of dozing  As a passenger in a car for an hour without a break: Slight chance of dozing  Lying down to rest in the afternoon when circumstances permit: Slight chance of dozing  Sitting and talking to someone: Slight chance of dozing  Sitting quietly after a lunch without alcohol: Slight chance of dozing  In a car, while stopped for a few minutes in traffic: Would never doze  Total score: 7     Review of Systems   Constitutional:  Negative for fever.   HENT:  Positive for postnasal drip.    Respiratory:   "Positive for cough.         Productive, light green mucous     Pertinent positives/negatives included in HPI and also as noted: coughing with URI symptoms x weeks    Current Outpatient Medications on File Prior to Visit   Medication Sig Dispense Refill    azaTHIOprine (IMURAN) 50 mg tablet Take 100 mg by mouth daily      carvedilol (COREG) 3.125 mg tablet Take 3.125 mg by mouth 2 (two) times a day with meals      cholecalciferol (VITAMIN D3) 400 units tablet Take 400 Units by mouth daily      loratadine (CLARITIN) 10 mg tablet Take 10 mg by mouth daily      tacrolimus (PROGRAF) 0.5 mg capsule Take 0.5 mg by mouth 2 (two) times a day      tacrolimus (PROGRAF) 1 mg capsule Take 2 mg by mouth every 12 (twelve) hours       No current facility-administered medications on file prior to visit.      Objective   /80   Ht 5' 7\" (1.702 m)   Wt 103 kg (226 lb)   BMI 35.40 kg/m²        Physical Exam    Constitutional: Alert, cooperative, no distress, appears stated age  Skin: Warm, dry, no rashes noted  Lungs: Clear to auscultation bilaterally, respirations unlabored  Heart: normal rate and regular rhythm, S1/2 normal, no murmur noted, no rub or gallop  Extremities: Normal, no digital clubbing, no pedal edema  Neuro: No focal deficits noted    Data  Lab Results   Component Value Date    HGB 11.1 (L) 02/13/2023    HCT 32.2 (L) 02/13/2023      Lab Results   Component Value Date    CALCIUM 9.6 12/27/2024    K 4.7 12/27/2024    CO2 21 12/27/2024     12/27/2024    BUN 30 (H) 12/27/2024    CREATININE 2.00 (H) 12/27/2024     Lab Results   Component Value Date    IRON 39 (L) 02/22/2023    TIBC 110 (L) 07/01/2022    FERRITIN 133.3 12/11/2023     Lab Results   Component Value Date    AST 18 12/27/2024    ALT 26 12/27/2024     Patient Instructions:    1.  Continue use of CPAP equipment nightly  2.  Continue to clean your equipment, as discussed  3.  Contact the [facility] with any questions or concerns prior to " your next visit, as needed  4.  Schedule visit for follow-up in 1 year     Recommendations for Patients, Caregivers, and Health Care Providers  Follow the ’s instructions in the user manualExternal Link Disclaimer, including:  place the CPAP machine on a firm, flat surface  keep the CPAP away from carpet, fabric, or other flammable materials  carefully clean the CPAP machine  empty the CPAP machine’s water reservoir  let the CPAP machine’s heater plate and water tank cool for approximately 15 minutes before removing the tank to reduce the risk of burns. You could be burned if you touch the heater plate, come in contact with the heated water, or touch the humidifier water tank pan.  Inspect and examine the CPAP machine before and after each use for unusual smells or changes in its appearance. Some problems may only be noticeable when the machine is running, so pay attention to any differences in the CPAP machine as you prepare for bed, before you fall asleep.  Unplug the CPAP machine and do not use it if:  you smell burning, smoke, or any unusual odors,  there is a change in the appearance of the CPAP machine,  there are unexplained changes to the CPAP machine’s performance,  water is spilled into the CPAP machine,  you hear unusual sounds coming from the CPAP machine.  If you are unable to use your CPAP device due to these issues, discuss your individual health situation with a health care provider, such as your primary care physician or sleep doctor.  At this time, if you are not experiencing these issues, then the FDA does not recommend discontinuing use of these machines.  Report any concerns about the CPAP machine to the FDA, including unusual smells, sounds, or changes in appearance, and report any concerns to [employer] Disclaimer.      Thank you for trusting me with your care!    I know we often  cover a lot of information at the visit, so if you have follow-up questions, are unclear  about the plan, or feel there were important items that we did not discuss or you did not receive clarity on, please don't hesitate to reach out to me.     [employer] messages are preferred for routine matters.  Please make sure to call for urgent matters as there can be a delay in responding to questions over [employer].    IMPORTANT- Prior to a sleep study (at home or in the sleep lab), I strongly recommend contacting your medical insurance first to understand your benefits (including deductible if applicable), coverage for this test, and out of pocket costs.  Even if the test is approved by medical insurance, the cost to you is determined by your medical benefits.   If you have concerns about your out of pocket costs for sleep testing, please contact me/the office before you complete the test and we can discuss if there are alternate options.     I recommend following this advice in general before any lab test, imaging test, doctor visit, surgery, or ordering CPAP supplies as it is best to understand your coverage to avoid unexpected bills after the fact.       Nursing Support:  When: Monday through Friday 7:30A-4:30PM except holidays  Where: Our direct line is [phone]  *3  *1.      If you are having a true emergency please call 911.  In the event that the line is busy or it is after hours please leave a voice message and we will return your call.  Please speak clearly, leaving your full name, birth date, best number to reach you and the reason for your call.   Medication refills: We will need the name of the medication, the dosage, the ordering provider, whether you get a 30 or 90 day refill, and the pharmacy name and address.  Medications will be ordered by the provider only.  Nurses cannot call in prescriptions.  Please allow 7 days for medication refills.  Physician requested updates: If your provider requested that you call with an update after starting medication, please be ready to provide us the medication  and dosage, what time you take your medication, the time you attempt to fall asleep, time you fall asleep, when you wake up, and what time you get out of bed.  Sleep Study Results: We will contact you with sleep study results and/or next steps after the physician has reviewed your testing.      Administrative Statements   I have spent a total time of 25 minutes in caring for this patient on the day of the visit/encounter including Risks and benefits of tx options, Instructions for management, Patient and family education, Importance of tx compliance, Risk factor reductions, Impressions, Counseling / Coordination of care, Documenting in the medical record, and Reviewing / ordering tests, medicine, procedures  .

## 2025-01-08 NOTE — ASSESSMENT & PLAN NOTE
He continues to use CPAP equipment, but has not been able to use it recently, due to ongoing URI symptoms, including post nasal drip and cough.  He usually sleeps well when using the equipment.  He feels the settings and nasal mask are comfortable.  He receives supplies regularly.  He has not had any issues involving the DreamStation 2.  Information regarding the [employer]' warning regarding increased incidences of overheating was provided in the AVS and he was advised to view their website information regarding any additional recommendations.      Current PAP Compliance Data:  Average usage:  He has been able to use the equipment 63.3% of all days recorded.  Average usage was 4 or more hours 36.7% of all days recorded.  Average hours used:  5 hours and 8 minutes  Average time in an air leak:  0 seconds   Average pressure:  9.6 cm of water pressure  Residual AHI:  1.1/hour, including 0.2 OA, 0.1 CA, 0.8 hypopneas    The patient feels they benefit from the use of PAP equipment and would like to continue PAP therapy.    Response to treatment has been good.    The patient is not at goal for hours of PAP use and at goal for effectiveness of treatment.  No changes are needed at this time.    We discussed the importance of increasing use, as able.  He was advised to schedule an appointment with his PCP regarding ongoing cough, which has become productive with colored secretions.  A prescription for supplies has been provided to last for the next year.  The patient was advised to continue to clean the equipment appropriately, as discussed and to change supplies regularly.    He will continue using this equipment at the settings noted above for the next 12 months.  At that timehe will then return for a routine follow-up evaluation. I have asked the patient to contact the [facility] if he encounters any difficulties prior to that time.  Orders:    PAP DME Resupply/Reorder

## 2025-01-09 ENCOUNTER — TELEPHONE (OUTPATIENT)
Dept: SLEEP CENTER | Facility: CLINIC | Age: 65
End: 2025-01-09

## 2025-01-10 LAB
